# Patient Record
Sex: MALE | Race: WHITE
[De-identification: names, ages, dates, MRNs, and addresses within clinical notes are randomized per-mention and may not be internally consistent; named-entity substitution may affect disease eponyms.]

---

## 2017-03-31 ENCOUNTER — HOSPITAL ENCOUNTER (EMERGENCY)
Dept: HOSPITAL 10 - FTE | Age: 32
Discharge: HOME | End: 2017-03-31
Payer: COMMERCIAL

## 2017-03-31 VITALS
WEIGHT: 234.79 LBS | HEIGHT: 70 IN | BODY MASS INDEX: 33.61 KG/M2 | WEIGHT: 234.79 LBS | HEIGHT: 70 IN | BODY MASS INDEX: 33.61 KG/M2

## 2017-03-31 DIAGNOSIS — R21: Primary | ICD-10-CM

## 2017-03-31 PROCEDURE — 99283 EMERGENCY DEPT VISIT LOW MDM: CPT

## 2017-04-01 NOTE — ERD
ER Documentation


Chief Complaint


Date/Time


DATE: 4/1/17 


TIME: 00:02


Chief Complaint


bug bites for a week





HPI


32-year-old male with no significant past medical history presents the ED 

complaining of possible insect bites that have been his left anterior shin and 

right hand.  States that it is very itchy.  Reports that his wife also has 

similar rash.  Denies any fever, chills, shortness of breath, cough, loss of 

sensation, loss of range of motion, numbness or tingling.





ROS


All systems reviewed and are negative except as per history of present illness.





Medications


Home Meds


Active Scripts


Permethrin* (Elimite*) 5% Cr, 1 APPLIC TOP ONCE, #1 TUB


   apply from the neck to the toes.  leave on for 8 hours and wash off.


   Prov:NATHAN CAROLINA PA-C         3/31/17


Hydrocortisone* Topical (Hydrocortisone* Topical) 1%-28.35 Gm Cream..g., 1 

APPLIC TOP Q6 Y for ITCHING, #1 TUB


   Prov:NATHAN CAROLINA PA-C         3/31/17


Diphenhydramine Hcl* (Benadryl*) 25 Mg Cap, 25 MG PO Q6, #30 CAP


   Prov:NATHAN CAROLINA PA-C         3/31/17





Allergies


Allergies:  


Coded Allergies:  


     No Known Allergy (Unverified , 3/31/17)





PMhx/Soc


Medical and Surgical Hx:  pt denies Medical Hx, pt denies Surgical Hx


Hx Alcohol Use:  No


Hx Substance Use:  No


Hx Tobacco Use:  No


Smoking Status:  Unknown if ever smoked





Physical Exam


Vitals





Vital Signs








  Date Time  Temp Pulse Resp B/P Pulse Ox O2 Delivery O2 Flow Rate FiO2


 


3/31/17 19:09 98.3 67 16 120/70 98   








Physical Exam


Const: Non-ill-appearing, well-nourished. In no acute distress.


Head: Atraumatic, normocephalic 


Eyes: Normal Conjunctiva without injection 


ENT: Normal external ear, nose and mouth. 


Neck: Full range of motion. No meningismus. 


Resp: Clear to auscultation bilaterally. No wheezing, rhonchi, rales, or 

crackles. No accessory muscle use. No retractions.


Cardio: Regular rate and rhythm, no murmurs


Skin: No petechiae or rashes


Back: No midline tenderness. No CVA tenderness.


Ext: No cyanosis, or edema. Cap refill less than 2 seconds. Distal pulses 

intact bilaterally.  1 mm erythematous macular papular rash noted on the 

anterior shin and right finger.  No fluctuance, induration, bleeding noted.


Neur: Awake and alert. Normal gait and coordination. Muscle strength 5/5. 

Sensation intact bilaterally.


Psych: Normal Mood and Affect





Procedures/MDM


This is a 32-year-old male with no significant past medical history presents 

the ED complaining of insect bites that are very itchy.  Patient is afebrile 

and nontoxic-appearing.  Patient has normal vital signs.  Patient's rash could 

be consistent with scabies versus insect bites versus beg bugs.  Patient was 

given a prescription for hydrocortisone, permethrin, Benadryl. Other 

differential diagnosis considered include but is not limited to allergic 

contact dermatitis, urticaria, insect bites, cutaneous candidiasis, eczema, 

tinea infection, psoriasis. Low suspicion for erythema multiforme, SJS/TEN, 

sepsis, cellulitis, necrotizing fascitis, meningococcemia, gangrene or other 

emergent conditions.





Follow up with primary care physician in 1-2 days. Instructed patient to return 

to the ED sooner for any worsening symptoms. Patient's questions were answered. 

Patient understood and agreed with discharge plan. Patient discharged stable.





Departure


Diagnosis:  


 Primary Impression:  


 Rash and nonspecific skin eruption


Condition:  Stable


Patient Instructions:  Self-Care for Skin Rashes, Scabies, Insect Bite


Referrals:  


COMM[facility]ITY [facility]ICS


YOU HAVE RECEIVED A MEDICAL SCREENING EXAM AND THE RESULTS INDICATE THAT YOU DO 

NOT HAVE A CONDITION THAT REQUIRES URGENT TREATMENT IN THE EMERGENCY DEPARTMENT.





FURTHER EVALUATION AND TREATMENT OF YOUR CONDITION CAN WAIT UNTIL YOU ARE SEEN 

IN YOUR DOCTORS OFFICE WITHIN THE NEXT 1-2 DAYS. IT IS YOUR RESPONSIBILITY TO 

MAKE AN APPOINTMENT FOR FOLOW-UP CARE.





IF YOU HAVE A PRIMARY DOCTOR


--you should call your primary doctor and schedule an appointment





IF YOU DO NOT HAVE A PRIMARY DOCTOR YOU CAN CALL OUR PHYSICIAN REFERRAL HOTLINE 

AT


 [phone] 





IF YOU CAN NOT AFFORD TO SEE A PHYSICIAN YOU CAN CHOSE FROM THE FOLLOWING 

[facility] CLINICS





[facility] [phone] [facility]





[facility] [phone] [address] [facility]. [facility] [phone] [address] [facility] [phone] [facility]. [facility] [phone] [facility]. [facility]. [phone] [address] [facility]. [facility]


YOU HAVE RECEIVED A MEDICAL SCREENING EXAM AND THE RESULTS INDICATE THAT YOU DO 

NOT HAVE A CONDITION THAT REQUIRES URGENT TREATMENT IN THE EMERGENCY DEPARTMENT.





FURTHER EVALUATION AND TREATMENT OF YOUR CONDITION CAN WAIT UNTIL YOU ARE SEEN 

IN YOUR DOCTORS OFFICE WITHIN THE NEXT 1-2 DAYS. IT IS YOUR RESPONSIBILITY TO 

MAKE AN APPOINTMENT FOR FOLOW-UP CARE.





IF YOU HAVE A PRIMARY DOCTOR


--you should call your primary doctor and schedule and appointment





IF YOU DO NOT HAVE A PRIMARY DOCTOR YOU CAN CALL OUR PHYSICIAN REFERRAL HOTLINE 

AT [phone].





IF YOU CAN NOT AFFORD TO SEE A PHYSICIAN YOU CAN CHOSE FROM THE FOLLOWING 

[facility] INSTITUTIONS:





[facility]


[address] [place] [address]





[facility]


[address] [place], [place] [id number] [place] [address]


[address] [place] [address]





[facility] URGENT CARE/SPECIALTIES





Additional Instructions:  


Call your primary care doctor TOMORROW for an appointment during the next 2-3 

days.See the doctor sooner or return here if your condition worsens before your 

appointment time.











NATHAN CAROLINA PA-C Apr 1, 2017 00:04

## 2018-01-03 ENCOUNTER — HOSPITAL ENCOUNTER (EMERGENCY)
Dept: HOSPITAL 91 - FTE | Age: 33
LOS: 1 days | Discharge: HOME | End: 2018-01-04
Payer: COMMERCIAL

## 2018-01-03 ENCOUNTER — HOSPITAL ENCOUNTER (EMERGENCY)
Age: 33
LOS: 1 days | Discharge: HOME | End: 2018-01-04

## 2018-01-03 DIAGNOSIS — M25.531: Primary | ICD-10-CM

## 2018-01-03 PROCEDURE — 99283 EMERGENCY DEPT VISIT LOW MDM: CPT

## 2018-01-03 PROCEDURE — 29125 APPL SHORT ARM SPLINT STATIC: CPT

## 2018-01-03 PROCEDURE — 73110 X-RAY EXAM OF WRIST: CPT

## 2018-01-04 RX ADMIN — IBUPROFEN 1 MG: 600 TABLET ORAL at 01:01
